# Patient Record
Sex: MALE | Race: OTHER | URBAN - METROPOLITAN AREA
[De-identification: names, ages, dates, MRNs, and addresses within clinical notes are randomized per-mention and may not be internally consistent; named-entity substitution may affect disease eponyms.]

---

## 2021-08-30 ENCOUNTER — APPOINTMENT (OUTPATIENT)
Dept: GENERAL RADIOLOGY | Age: 21
DRG: 316 | End: 2021-08-30
Attending: STUDENT IN AN ORGANIZED HEALTH CARE EDUCATION/TRAINING PROGRAM
Payer: MEDICAID

## 2021-08-30 ENCOUNTER — HOSPITAL ENCOUNTER (INPATIENT)
Age: 21
LOS: 3 days | Discharge: HOME | DRG: 316 | End: 2021-09-02
Attending: STUDENT IN AN ORGANIZED HEALTH CARE EDUCATION/TRAINING PROGRAM | Admitting: ORTHOPAEDIC SURGERY
Payer: MEDICAID

## 2021-08-30 DIAGNOSIS — S62.202B: Primary | ICD-10-CM
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
## 2021-08-30 PROBLEM — T14.8XXA COMMINUTED FRACTURE: Status: ACTIVE | Noted: 2021-08-30

## 2021-08-30 LAB
ABO + RH BLD: NORMAL
ALBUMIN SERPL-MCNC: 4.7 G/DL (ref 3.5–5)
ALBUMIN/GLOB SERPL: 1.6 {RATIO} (ref 1.1–2.2)
ALP SERPL-CCNC: 101 U/L (ref 45–117)
ALT SERPL-CCNC: 21 U/L (ref 12–78)
ANION GAP SERPL CALC-SCNC: 9 MMOL/L (ref 5–15)
APTT PPP: 29 SEC (ref 21.2–34.1)
AST SERPL W P-5'-P-CCNC: 18 U/L (ref 15–37)
BASOPHILS # BLD: 0 K/UL (ref 0–0.1)
BASOPHILS NFR BLD: 0 % (ref 0–1)
BILIRUB SERPL-MCNC: 1.1 MG/DL (ref 0.2–1)
BLOOD GROUP ANTIBODIES SERPL: NEGATIVE
BUN SERPL-MCNC: 24 MG/DL (ref 6–20)
BUN/CREAT SERPL: 24 (ref 12–20)
CA-I BLD-MCNC: 9.5 MG/DL (ref 8.5–10.1)
CHLORIDE SERPL-SCNC: 108 MMOL/L (ref 97–108)
CO2 SERPL-SCNC: 22 MMOL/L (ref 21–32)
CREAT SERPL-MCNC: 1 MG/DL (ref 0.7–1.3)
DIFFERENTIAL METHOD BLD: ABNORMAL
EOSINOPHIL # BLD: 0.1 K/UL (ref 0–0.4)
EOSINOPHIL NFR BLD: 1 % (ref 0–7)
ERYTHROCYTE [DISTWIDTH] IN BLOOD BY AUTOMATED COUNT: 12 % (ref 11.5–14.5)
GLOBULIN SER CALC-MCNC: 3 G/DL (ref 2–4)
GLUCOSE SERPL-MCNC: 99 MG/DL (ref 65–100)
HCT VFR BLD AUTO: 45.7 % (ref 36.6–50.3)
HGB BLD-MCNC: 16.2 G/DL (ref 12.1–17)
IMM GRANULOCYTES # BLD AUTO: 0 K/UL (ref 0–0.04)
IMM GRANULOCYTES NFR BLD AUTO: 0 % (ref 0–0.5)
INR PPP: 1.4 (ref 0.9–1.1)
LYMPHOCYTES # BLD: 0.9 K/UL (ref 0.8–3.5)
LYMPHOCYTES NFR BLD: 8 % (ref 12–49)
MCH RBC QN AUTO: 31.8 PG (ref 26–34)
MCHC RBC AUTO-ENTMCNC: 35.4 G/DL (ref 30–36.5)
MCV RBC AUTO: 89.6 FL (ref 80–99)
MONOCYTES # BLD: 1 K/UL (ref 0–1)
MONOCYTES NFR BLD: 8 % (ref 5–13)
NEUTS SEG # BLD: 9.9 K/UL (ref 1.8–8)
NEUTS SEG NFR BLD: 83 % (ref 32–75)
NRBC # BLD: 0 K/UL (ref 0–0.01)
NRBC BLD-RTO: 0 PER 100 WBC
PLATELET # BLD AUTO: 198 K/UL (ref 150–400)
PMV BLD AUTO: 10 FL (ref 8.9–12.9)
POTASSIUM SERPL-SCNC: 3.4 MMOL/L (ref 3.5–5.1)
PROT SERPL-MCNC: 7.7 G/DL (ref 6.4–8.2)
PROTHROMBIN TIME: 16.6 SEC (ref 11.9–14.7)
RBC # BLD AUTO: 5.1 M/UL (ref 4.1–5.7)
SODIUM SERPL-SCNC: 139 MMOL/L (ref 136–145)
SPECIMEN EXP DATE BLD: NORMAL
THERAPEUTIC RANGE,PTTT: NORMAL SEC (ref 82–109)
WBC # BLD AUTO: 11.9 K/UL (ref 4.1–11.1)

## 2021-08-30 PROCEDURE — 90471 IMMUNIZATION ADMIN: CPT

## 2021-08-30 PROCEDURE — 73130 X-RAY EXAM OF HAND: CPT

## 2021-08-30 PROCEDURE — 74011000258 HC RX REV CODE- 258: Performed by: STUDENT IN AN ORGANIZED HEALTH CARE EDUCATION/TRAINING PROGRAM

## 2021-08-30 PROCEDURE — 86900 BLOOD TYPING SEROLOGIC ABO: CPT

## 2021-08-30 PROCEDURE — 80053 COMPREHEN METABOLIC PANEL: CPT

## 2021-08-30 PROCEDURE — 99284 EMERGENCY DEPT VISIT MOD MDM: CPT

## 2021-08-30 PROCEDURE — 65270000029 HC RM PRIVATE

## 2021-08-30 PROCEDURE — 71045 X-RAY EXAM CHEST 1 VIEW: CPT

## 2021-08-30 PROCEDURE — 36415 COLL VENOUS BLD VENIPUNCTURE: CPT

## 2021-08-30 PROCEDURE — 96375 TX/PRO/DX INJ NEW DRUG ADDON: CPT

## 2021-08-30 PROCEDURE — 93005 ELECTROCARDIOGRAM TRACING: CPT

## 2021-08-30 PROCEDURE — 85730 THROMBOPLASTIN TIME PARTIAL: CPT

## 2021-08-30 PROCEDURE — 85025 COMPLETE CBC W/AUTO DIFF WBC: CPT

## 2021-08-30 PROCEDURE — 75810000283 HC INJECTION NERVE BLOCK

## 2021-08-30 PROCEDURE — 96374 THER/PROPH/DIAG INJ IV PUSH: CPT

## 2021-08-30 PROCEDURE — 90715 TDAP VACCINE 7 YRS/> IM: CPT | Performed by: STUDENT IN AN ORGANIZED HEALTH CARE EDUCATION/TRAINING PROGRAM

## 2021-08-30 PROCEDURE — 73090 X-RAY EXAM OF FOREARM: CPT

## 2021-08-30 PROCEDURE — 85610 PROTHROMBIN TIME: CPT

## 2021-08-30 PROCEDURE — 74011250636 HC RX REV CODE- 250/636: Performed by: STUDENT IN AN ORGANIZED HEALTH CARE EDUCATION/TRAINING PROGRAM

## 2021-08-30 PROCEDURE — 74011250637 HC RX REV CODE- 250/637: Performed by: STUDENT IN AN ORGANIZED HEALTH CARE EDUCATION/TRAINING PROGRAM

## 2021-08-30 RX ORDER — ACETAMINOPHEN 500 MG
1000 TABLET ORAL
Status: COMPLETED | OUTPATIENT
Start: 2021-08-30 | End: 2021-08-30

## 2021-08-30 RX ORDER — FENTANYL CITRATE 50 UG/ML
100 INJECTION, SOLUTION INTRAMUSCULAR; INTRAVENOUS
Status: DISCONTINUED | OUTPATIENT
Start: 2021-08-30 | End: 2021-08-31

## 2021-08-30 RX ORDER — FENTANYL CITRATE 50 UG/ML
50 INJECTION, SOLUTION INTRAMUSCULAR; INTRAVENOUS
Status: COMPLETED | OUTPATIENT
Start: 2021-08-30 | End: 2021-08-31

## 2021-08-30 RX ORDER — LIDOCAINE HYDROCHLORIDE 10 MG/ML
10 INJECTION INFILTRATION; PERINEURAL ONCE
Status: DISCONTINUED | OUTPATIENT
Start: 2021-08-30 | End: 2021-08-30

## 2021-08-30 RX ORDER — OXYCODONE HYDROCHLORIDE 5 MG/1
5 TABLET ORAL
Status: COMPLETED | OUTPATIENT
Start: 2021-08-30 | End: 2021-08-30

## 2021-08-30 RX ORDER — MORPHINE SULFATE 4 MG/ML
4 INJECTION INTRAVENOUS ONCE
Status: COMPLETED | OUTPATIENT
Start: 2021-08-30 | End: 2021-08-30

## 2021-08-30 RX ORDER — LIDOCAINE HYDROCHLORIDE 10 MG/ML
10 INJECTION INFILTRATION; PERINEURAL ONCE
Status: COMPLETED | OUTPATIENT
Start: 2021-08-30 | End: 2021-08-31

## 2021-08-30 RX ADMIN — ACETAMINOPHEN 1000 MG: 500 TABLET ORAL at 19:20

## 2021-08-30 RX ADMIN — TETANUS TOXOID, REDUCED DIPHTHERIA TOXOID AND ACELLULAR PERTUSSIS VACCINE, ADSORBED 0.5 ML: 5; 2.5; 8; 8; 2.5 SUSPENSION INTRAMUSCULAR at 19:20

## 2021-08-30 RX ADMIN — AMPICILLIN SODIUM AND SULBACTAM SODIUM 3 G: 2; 1 INJECTION, POWDER, FOR SOLUTION INTRAMUSCULAR; INTRAVENOUS at 21:05

## 2021-08-30 RX ADMIN — MORPHINE SULFATE 4 MG: 4 INJECTION, SOLUTION INTRAMUSCULAR; INTRAVENOUS at 21:05

## 2021-08-30 RX ADMIN — OXYCODONE 5 MG: 5 TABLET ORAL at 19:20

## 2021-08-30 NOTE — Clinical Note
Status[de-identified] INPATIENT [101]   Type of Bed: Surgical [18]   Inpatient Hospitalization Certified Necessary for the Following Reasons: 9.  Other (further clarification in H&P documentation)   Admitting Diagnosis: Dog bite [[id number]]   Admitting Diagnosis: Comminuted fracture [[id number]]   Admitting Physician: [id number], [address] [facility]   Attending Physician: Jaci Marie   Estimated Length of Stay: 3-4 Midnights   Discharge Plan[de-identified] Home with Office Follow-up

## 2021-08-31 ENCOUNTER — ANESTHESIA (OUTPATIENT)
Dept: SURGERY | Age: 21
DRG: 316 | End: 2021-08-31
Payer: MEDICAID

## 2021-08-31 ENCOUNTER — ANESTHESIA EVENT (OUTPATIENT)
Dept: SURGERY | Age: 21
DRG: 316 | End: 2021-08-31
Payer: MEDICAID

## 2021-08-31 ENCOUNTER — APPOINTMENT (OUTPATIENT)
Dept: GENERAL RADIOLOGY | Age: 21
DRG: 316 | End: 2021-08-31
Attending: ORTHOPAEDIC SURGERY
Payer: MEDICAID

## 2021-08-31 LAB
ATRIAL RATE: 68 BPM
CALCULATED P AXIS, ECG09: 55 DEGREES
CALCULATED R AXIS, ECG10: 71 DEGREES
CALCULATED T AXIS, ECG11: 45 DEGREES
COVID-19 RAPID TEST, COVR: NOT DETECTED
DIAGNOSIS, 93000: NORMAL
P-R INTERVAL, ECG05: 190 MS
Q-T INTERVAL, ECG07: 390 MS
QRS DURATION, ECG06: 106 MS
QTC CALCULATION (BEZET), ECG08: 414 MS
SPECIMEN SOURCE: NORMAL
VENTRICULAR RATE, ECG03: 68 BPM

## 2021-08-31 PROCEDURE — 77030012058: Performed by: ORTHOPAEDIC SURGERY

## 2021-08-31 PROCEDURE — 36415 COLL VENOUS BLD VENIPUNCTURE: CPT

## 2021-08-31 PROCEDURE — 74011000250 HC RX REV CODE- 250: Performed by: NURSE ANESTHETIST, CERTIFIED REGISTERED

## 2021-08-31 PROCEDURE — 77030003028 HC SUT VCRL J&J -A: Performed by: ORTHOPAEDIC SURGERY

## 2021-08-31 PROCEDURE — 74011250636 HC RX REV CODE- 250/636: Performed by: ORTHOPAEDIC SURGERY

## 2021-08-31 PROCEDURE — 77030002982 HC SUT POLYSRB J&J -A: Performed by: ORTHOPAEDIC SURGERY

## 2021-08-31 PROCEDURE — 76210000017 HC OR PH I REC 1.5 TO 2 HR: Performed by: ORTHOPAEDIC SURGERY

## 2021-08-31 PROCEDURE — 74011250636 HC RX REV CODE- 250/636: Performed by: ANESTHESIOLOGY

## 2021-08-31 PROCEDURE — 74011000258 HC RX REV CODE- 258: Performed by: STUDENT IN AN ORGANIZED HEALTH CARE EDUCATION/TRAINING PROGRAM

## 2021-08-31 PROCEDURE — 77030031140 HC SUT VCRL3 J&J -A: Performed by: ORTHOPAEDIC SURGERY

## 2021-08-31 PROCEDURE — 74011000250 HC RX REV CODE- 250: Performed by: STUDENT IN AN ORGANIZED HEALTH CARE EDUCATION/TRAINING PROGRAM

## 2021-08-31 PROCEDURE — 2709999900 HC NON-CHARGEABLE SUPPLY: Performed by: ORTHOPAEDIC SURGERY

## 2021-08-31 PROCEDURE — 74011250636 HC RX REV CODE- 250/636: Performed by: NURSE ANESTHETIST, CERTIFIED REGISTERED

## 2021-08-31 PROCEDURE — 87635 SARS-COV-2 COVID-19 AMP PRB: CPT

## 2021-08-31 PROCEDURE — 74011250636 HC RX REV CODE- 250/636: Performed by: STUDENT IN AN ORGANIZED HEALTH CARE EDUCATION/TRAINING PROGRAM

## 2021-08-31 PROCEDURE — 76060000034 HC ANESTHESIA 1.5 TO 2 HR: Performed by: ORTHOPAEDIC SURGERY

## 2021-08-31 PROCEDURE — 0KB90ZZ EXCISION OF RIGHT LOWER ARM AND WRIST MUSCLE, OPEN APPROACH: ICD-10-PCS | Performed by: ORTHOPAEDIC SURGERY

## 2021-08-31 PROCEDURE — 76000 FLUOROSCOPY <1 HR PHYS/QHP: CPT

## 2021-08-31 PROCEDURE — 77030008463 HC STPLR SKN PROX J&J -B: Performed by: ORTHOPAEDIC SURGERY

## 2021-08-31 PROCEDURE — 76010000153 HC OR TIME 1.5 TO 2 HR: Performed by: ORTHOPAEDIC SURGERY

## 2021-08-31 PROCEDURE — 77030002916 HC SUT ETHLN J&J -A: Performed by: ORTHOPAEDIC SURGERY

## 2021-08-31 PROCEDURE — 77030040361 HC SLV COMPR DVT MDII -B: Performed by: ORTHOPAEDIC SURGERY

## 2021-08-31 PROCEDURE — 65270000029 HC RM PRIVATE

## 2021-08-31 PROCEDURE — 0PBQ0ZZ EXCISION OF LEFT METACARPAL, OPEN APPROACH: ICD-10-PCS | Performed by: ORTHOPAEDIC SURGERY

## 2021-08-31 RX ORDER — KETOROLAC TROMETHAMINE 30 MG/ML
15 INJECTION, SOLUTION INTRAMUSCULAR; INTRAVENOUS
Status: DISCONTINUED | OUTPATIENT
Start: 2021-08-31 | End: 2021-08-31 | Stop reason: HOSPADM

## 2021-08-31 RX ORDER — FENTANYL CITRATE 50 UG/ML
INJECTION, SOLUTION INTRAMUSCULAR; INTRAVENOUS AS NEEDED
Status: DISCONTINUED | OUTPATIENT
Start: 2021-08-31 | End: 2021-08-31 | Stop reason: HOSPADM

## 2021-08-31 RX ORDER — MORPHINE SULFATE 4 MG/ML
4 INJECTION INTRAVENOUS
Status: DISCONTINUED | OUTPATIENT
Start: 2021-08-31 | End: 2021-08-31

## 2021-08-31 RX ORDER — FENTANYL CITRATE 50 UG/ML
50 INJECTION, SOLUTION INTRAMUSCULAR; INTRAVENOUS
Status: DISCONTINUED | OUTPATIENT
Start: 2021-08-31 | End: 2021-08-31 | Stop reason: HOSPADM

## 2021-08-31 RX ORDER — NORETHINDRONE AND ETHINYL ESTRADIOL 0.5-0.035
5 KIT ORAL AS NEEDED
Status: DISCONTINUED | OUTPATIENT
Start: 2021-08-31 | End: 2021-08-31 | Stop reason: HOSPADM

## 2021-08-31 RX ORDER — SODIUM CHLORIDE 0.9 % (FLUSH) 0.9 %
5-40 SYRINGE (ML) INJECTION AS NEEDED
Status: DISCONTINUED | OUTPATIENT
Start: 2021-08-31 | End: 2021-08-31 | Stop reason: HOSPADM

## 2021-08-31 RX ORDER — HYDROMORPHONE HYDROCHLORIDE 1 MG/ML
0.4 INJECTION, SOLUTION INTRAMUSCULAR; INTRAVENOUS; SUBCUTANEOUS
Status: DISCONTINUED | OUTPATIENT
Start: 2021-08-31 | End: 2021-08-31 | Stop reason: HOSPADM

## 2021-08-31 RX ORDER — SODIUM CHLORIDE 0.9 % (FLUSH) 0.9 %
5-40 SYRINGE (ML) INJECTION AS NEEDED
Status: DISCONTINUED | OUTPATIENT
Start: 2021-08-31 | End: 2021-09-02 | Stop reason: HOSPADM

## 2021-08-31 RX ORDER — ONDANSETRON 2 MG/ML
4 INJECTION INTRAMUSCULAR; INTRAVENOUS AS NEEDED
Status: DISCONTINUED | OUTPATIENT
Start: 2021-08-31 | End: 2021-08-31 | Stop reason: HOSPADM

## 2021-08-31 RX ORDER — MORPHINE SULFATE 4 MG/ML
4 INJECTION INTRAVENOUS
Status: DISPENSED | OUTPATIENT
Start: 2021-08-31 | End: 2021-09-02

## 2021-08-31 RX ORDER — PROPOFOL 10 MG/ML
INJECTION, EMULSION INTRAVENOUS AS NEEDED
Status: DISCONTINUED | OUTPATIENT
Start: 2021-08-31 | End: 2021-08-31 | Stop reason: HOSPADM

## 2021-08-31 RX ORDER — ONDANSETRON 2 MG/ML
INJECTION INTRAMUSCULAR; INTRAVENOUS AS NEEDED
Status: DISCONTINUED | OUTPATIENT
Start: 2021-08-31 | End: 2021-08-31 | Stop reason: HOSPADM

## 2021-08-31 RX ORDER — LIDOCAINE HYDROCHLORIDE 20 MG/ML
INJECTION, SOLUTION EPIDURAL; INFILTRATION; INTRACAUDAL; PERINEURAL AS NEEDED
Status: DISCONTINUED | OUTPATIENT
Start: 2021-08-31 | End: 2021-08-31 | Stop reason: HOSPADM

## 2021-08-31 RX ORDER — SODIUM CHLORIDE 0.9 % (FLUSH) 0.9 %
5-40 SYRINGE (ML) INJECTION EVERY 8 HOURS
Status: DISCONTINUED | OUTPATIENT
Start: 2021-08-31 | End: 2021-09-02 | Stop reason: HOSPADM

## 2021-08-31 RX ORDER — LIDOCAINE HYDROCHLORIDE 10 MG/ML
0.1 INJECTION, SOLUTION EPIDURAL; INFILTRATION; INTRACAUDAL; PERINEURAL AS NEEDED
Status: DISCONTINUED | OUTPATIENT
Start: 2021-08-31 | End: 2021-08-31 | Stop reason: HOSPADM

## 2021-08-31 RX ORDER — SODIUM CHLORIDE, SODIUM LACTATE, POTASSIUM CHLORIDE, CALCIUM CHLORIDE 600; 310; 30; 20 MG/100ML; MG/100ML; MG/100ML; MG/100ML
INJECTION, SOLUTION INTRAVENOUS
Status: DISCONTINUED | OUTPATIENT
Start: 2021-08-31 | End: 2021-08-31 | Stop reason: HOSPADM

## 2021-08-31 RX ORDER — SODIUM CHLORIDE 0.9 % (FLUSH) 0.9 %
5-40 SYRINGE (ML) INJECTION EVERY 8 HOURS
Status: DISCONTINUED | OUTPATIENT
Start: 2021-08-31 | End: 2021-08-31 | Stop reason: HOSPADM

## 2021-08-31 RX ORDER — MIDAZOLAM HYDROCHLORIDE 1 MG/ML
INJECTION, SOLUTION INTRAMUSCULAR; INTRAVENOUS AS NEEDED
Status: DISCONTINUED | OUTPATIENT
Start: 2021-08-31 | End: 2021-08-31 | Stop reason: HOSPADM

## 2021-08-31 RX ORDER — DEXAMETHASONE SODIUM PHOSPHATE 4 MG/ML
INJECTION, SOLUTION INTRA-ARTICULAR; INTRALESIONAL; INTRAMUSCULAR; INTRAVENOUS; SOFT TISSUE AS NEEDED
Status: DISCONTINUED | OUTPATIENT
Start: 2021-08-31 | End: 2021-08-31 | Stop reason: HOSPADM

## 2021-08-31 RX ORDER — HYDROCODONE BITARTRATE AND ACETAMINOPHEN 5; 325 MG/1; MG/1
1 TABLET ORAL AS NEEDED
Status: DISCONTINUED | OUTPATIENT
Start: 2021-08-31 | End: 2021-08-31 | Stop reason: HOSPADM

## 2021-08-31 RX ORDER — MIDAZOLAM HYDROCHLORIDE 1 MG/ML
0.5 INJECTION, SOLUTION INTRAMUSCULAR; INTRAVENOUS
Status: DISCONTINUED | OUTPATIENT
Start: 2021-08-31 | End: 2021-08-31 | Stop reason: HOSPADM

## 2021-08-31 RX ADMIN — FENTANYL CITRATE 25 MCG: 50 INJECTION, SOLUTION INTRAMUSCULAR; INTRAVENOUS at 15:31

## 2021-08-31 RX ADMIN — ONDANSETRON 4 MG: 2 INJECTION INTRAMUSCULAR; INTRAVENOUS at 15:04

## 2021-08-31 RX ADMIN — FENTANYL CITRATE 25 MCG: 50 INJECTION, SOLUTION INTRAMUSCULAR; INTRAVENOUS at 15:27

## 2021-08-31 RX ADMIN — SODIUM CHLORIDE, POTASSIUM CHLORIDE, SODIUM LACTATE AND CALCIUM CHLORIDE: 600; 310; 30; 20 INJECTION, SOLUTION INTRAVENOUS at 14:45

## 2021-08-31 RX ADMIN — FENTANYL CITRATE 50 MCG: 50 INJECTION, SOLUTION INTRAMUSCULAR; INTRAVENOUS at 17:28

## 2021-08-31 RX ADMIN — MORPHINE SULFATE 4 MG: 4 INJECTION INTRAVENOUS at 01:56

## 2021-08-31 RX ADMIN — AMPICILLIN SODIUM AND SULBACTAM SODIUM 3 G: 2; 1 INJECTION, POWDER, FOR SOLUTION INTRAMUSCULAR; INTRAVENOUS at 22:26

## 2021-08-31 RX ADMIN — FENTANYL CITRATE 25 MCG: 50 INJECTION, SOLUTION INTRAMUSCULAR; INTRAVENOUS at 15:07

## 2021-08-31 RX ADMIN — MORPHINE SULFATE 4 MG: 4 INJECTION INTRAVENOUS at 07:29

## 2021-08-31 RX ADMIN — MORPHINE SULFATE 4 MG: 4 INJECTION, SOLUTION INTRAMUSCULAR; INTRAVENOUS at 19:34

## 2021-08-31 RX ADMIN — FENTANYL CITRATE 50 MCG: 50 INJECTION, SOLUTION INTRAMUSCULAR; INTRAVENOUS at 17:10

## 2021-08-31 RX ADMIN — PROPOFOL 200 MG: 10 INJECTION, EMULSION INTRAVENOUS at 14:47

## 2021-08-31 RX ADMIN — MIDAZOLAM HYDROCHLORIDE 2 MG: 2 INJECTION, SOLUTION INTRAMUSCULAR; INTRAVENOUS at 14:44

## 2021-08-31 RX ADMIN — FENTANYL CITRATE 25 MCG: 50 INJECTION, SOLUTION INTRAMUSCULAR; INTRAVENOUS at 15:50

## 2021-08-31 RX ADMIN — LIDOCAINE HYDROCHLORIDE 100 MG: 20 INJECTION, SOLUTION EPIDURAL; INFILTRATION; INTRACAUDAL; PERINEURAL at 14:47

## 2021-08-31 RX ADMIN — AMPICILLIN SODIUM AND SULBACTAM SODIUM 3 G: 2; 1 INJECTION, POWDER, FOR SOLUTION INTRAMUSCULAR; INTRAVENOUS at 10:52

## 2021-08-31 RX ADMIN — LIDOCAINE HYDROCHLORIDE 10 ML: 10 INJECTION, SOLUTION INFILTRATION; PERINEURAL at 00:04

## 2021-08-31 RX ADMIN — DEXAMETHASONE SODIUM PHOSPHATE 8 MG: 4 INJECTION, SOLUTION INTRA-ARTICULAR; INTRALESIONAL; INTRAMUSCULAR; INTRAVENOUS; SOFT TISSUE at 15:04

## 2021-08-31 RX ADMIN — MORPHINE SULFATE 4 MG: 4 INJECTION, SOLUTION INTRAMUSCULAR; INTRAVENOUS at 11:46

## 2021-08-31 RX ADMIN — AMPICILLIN SODIUM AND SULBACTAM SODIUM 3 G: 2; 1 INJECTION, POWDER, FOR SOLUTION INTRAMUSCULAR; INTRAVENOUS at 17:55

## 2021-08-31 RX ADMIN — Medication 10 ML: at 22:31

## 2021-08-31 RX ADMIN — MORPHINE SULFATE 4 MG: 4 INJECTION, SOLUTION INTRAMUSCULAR; INTRAVENOUS at 23:17

## 2021-08-31 RX ADMIN — SODIUM CHLORIDE, POTASSIUM CHLORIDE, SODIUM LACTATE AND CALCIUM CHLORIDE: 600; 310; 30; 20 INJECTION, SOLUTION INTRAVENOUS at 15:04

## 2021-08-31 RX ADMIN — AMPICILLIN SODIUM AND SULBACTAM SODIUM 3 G: 2; 1 INJECTION, POWDER, FOR SOLUTION INTRAMUSCULAR; INTRAVENOUS at 03:31

## 2021-08-31 RX ADMIN — FENTANYL CITRATE 50 MCG: 50 INJECTION, SOLUTION INTRAMUSCULAR; INTRAVENOUS at 00:49

## 2021-08-31 RX ADMIN — Medication 10 ML: at 18:11

## 2021-08-31 NOTE — ED PROVIDER NOTES
EMERGENCY DEPARTMENT HISTORY AND PHYSICAL EXAM      Date: 8/30/2021  Patient Name: Michel Wright      History of Presenting Illness     Chief Complaint   Patient presents with    Animal Bite       History Provided By: Patient    HPI: Michel Wright, 24 y.o. male with no chronic PMH of note presented emerge department after being bit at home by his dog. 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 He reports to injuries. One on his right forearm and one on his left hand. Patient denied any other injuries. He does not recall his last tetanus shot. Patient denied any prior incidences with his dog. Patient is denying any weakness or numbness of his hand. He does report that he has significant pain when he is trying to move his hand. Otherwise, he is able to move his fingers. There are no other complaints, changes, or physical findings at this time. PCP: None    Current Facility-Administered Medications   Medication Dose Route Frequency Provider Last Rate Last Admin    ampicillin-sulbactam (UNASYN) 3 g in 0.9% sodium chloride (MBP/ADV) 100 mL MBP  3 g IntraVENous Q6H Esvin Kumari MD        fentaNYL citrate (PF) injection 50 mcg  50 mcg IntraVENous NOW Esvin Kumari MD        fentaNYL citrate (PF) injection 100 mcg  100 mcg IntraVENous NOW Esvin Kumari MD           Past History     Past Medical History:  None    Past Surgical History:  None    Family History:  Noncontributory    Social History:  Social History     Tobacco Use    Smoking status: Not on file   Substance Use Topics    Alcohol use: Not on file    Drug use: Not on file       Allergies: Allergies   Allergen Reactions    Benadryl [Diphenhydramine Hcl] Angioedema         Review of Systems     Review of Systems   Constitutional: Negative for activity change, chills and fever. HENT: Negative for congestion and facial swelling. Eyes: Negative for discharge and visual disturbance. Respiratory: Negative for chest tightness and shortness of breath. Cardiovascular: Negative for chest pain and leg swelling. Gastrointestinal: Negative for abdominal pain, diarrhea and vomiting. Genitourinary: Negative for dysuria and flank pain. Musculoskeletal: Negative for back pain and gait problem. Skin: Positive for wound. Negative for rash. Neurological: Negative for dizziness, weakness and headaches. Physical Exam     Physical Exam  Vitals and nursing note reviewed. Constitutional:       General: He is not in acute distress. Appearance: Normal appearance. He is not ill-appearing, toxic-appearing or diaphoretic. HENT:      Head: Normocephalic and atraumatic. Mouth/Throat:      Mouth: Mucous membranes are moist.      Pharynx: Oropharynx is clear. Eyes:      Extraocular Movements: Extraocular movements intact. Conjunctiva/sclera: Conjunctivae normal.   Cardiovascular:      Rate and Rhythm: Normal rate and regular rhythm. Pulmonary:      Effort: Pulmonary effort is normal.      Breath sounds: Normal breath sounds. Abdominal:      General: Abdomen is flat. Tenderness: There is no abdominal tenderness. Musculoskeletal:      Right upper arm: Normal.      Left upper arm: Normal.      Right elbow: No swelling, deformity, effusion or lacerations. Normal range of motion. No tenderness. Left elbow: No swelling, deformity, effusion or lacerations. Normal range of motion. No tenderness. Right forearm: Laceration (Abrasion) and tenderness present. No swelling, edema, deformity or bony tenderness. Left forearm: No swelling, edema, deformity, lacerations, tenderness or bony tenderness. Right wrist: Normal.      Left wrist: Normal.      Right hand: Laceration, tenderness and bony tenderness present. Decreased range of motion. Normal strength. Normal sensation. Normal capillary refill. Normal pulse.       Left hand: Normal.      Cervical back: Normal range of motion and neck supple. Neurological:      Mental Status: He is alert and oriented to person, place, and time. Sensory: No sensory deficit. Motor: No weakness. Lab and Diagnostic Study Results     Labs -     Recent Results (from the past 12 hour(s))   CBC WITH AUTOMATED DIFF    Collection Time: 08/30/21  8:44 PM   Result Value Ref Range    WBC 11.9 (H) 4.1 - 11.1 K/uL    RBC 5.10 4. 10 - 5.70 M/uL    HGB 16.2 12.1 - 17.0 g/dL    HCT 45.7 36.6 - 50.3 %    MCV 89.6 80.0 - 99.0 FL    MCH 31.8 26.0 - 34.0 PG    MCHC 35.4 30.0 - 36.5 g/dL    RDW 12.0 11.5 - 14.5 %    PLATELET 820 471 - 256 K/uL    MPV 10.0 8.9 - 12.9 FL    NRBC 0.0 0.0  WBC    ABSOLUTE NRBC 0.00 0.00 - 0.01 K/uL    NEUTROPHILS 83 (H) 32 - 75 %    LYMPHOCYTES 8 (L) 12 - 49 %    MONOCYTES 8 5 - 13 %    EOSINOPHILS 1 0 - 7 %    BASOPHILS 0 0 - 1 %    IMMATURE GRANULOCYTES 0 0 - 0.5 %    ABS. NEUTROPHILS 9.9 (H) 1.8 - 8.0 K/UL    ABS. LYMPHOCYTES 0.9 0.8 - 3.5 K/UL    ABS. MONOCYTES 1.0 0.0 - 1.0 K/UL    ABS. EOSINOPHILS 0.1 0.0 - 0.4 K/UL    ABS. BASOPHILS 0.0 0.0 - 0.1 K/UL    ABS. IMM. GRANS. 0.0 0.00 - 0.04 K/UL    DF AUTOMATED     METABOLIC PANEL, COMPREHENSIVE    Collection Time: 08/30/21  8:44 PM   Result Value Ref Range    Sodium 139 136 - 145 mmol/L    Potassium 3.4 (L) 3.5 - 5.1 mmol/L    Chloride 108 97 - 108 mmol/L    CO2 22 21 - 32 mmol/L    Anion gap 9 5 - 15 mmol/L    Glucose 99 65 - 100 mg/dL    BUN 24 (H) 6 - 20 mg/dL    Creatinine 1.00 0.70 - 1.30 mg/dL    BUN/Creatinine ratio 24 (H) 12 - 20      GFR est AA >60 >60 ml/min/1.73m2    GFR est non-AA >60 >60 ml/min/1.73m2    Calcium 9.5 8.5 - 10.1 mg/dL    Bilirubin, total 1.1 (H) 0.2 - 1.0 mg/dL    AST (SGOT) 18 15 - 37 U/L    ALT (SGPT) 21 12 - 78 U/L    Alk.  phosphatase 101 45 - 117 U/L    Protein, total 7.7 6.4 - 8.2 g/dL    Albumin 4.7 3.5 - 5.0 g/dL    Globulin 3.0 2.0 - 4.0 g/dL    A-G Ratio 1.6 1.1 - 2.2     PROTHROMBIN TIME + INR    Collection Time: 08/30/21  8:44 PM   Result Value Ref Range    Prothrombin time 16.6 (H) 11.9 - 14.7 sec    INR 1.4 (H) 0.9 - 1.1     PTT    Collection Time: 08/30/21  8:44 PM   Result Value Ref Range    aPTT 29.0 21.2 - 34.1 sec    aPTT, therapeutic range   82 - 109 sec   TYPE & SCREEN    Collection Time: 08/30/21  8:44 PM   Result Value Ref Range    Crossmatch Expiration 09/02/2021,2359     ABO/Rh(D) Shante Richard Positive     Antibody screen Negative        Radiologic Studies -   [unfilled]  CT Results  (Last 48 hours)    None        CXR Results  (Last 48 hours)               08/30/21 2129  XR CHEST PORT Final result    Impression:  No evidence of an acute cardiopulmonary process. Narrative:  XR CHEST PORT       Comparison:  None available       Single view: The lungs are clear. No pneumothorax or pleural effusion apparent. The cardiomediastinum is unremarkable. There is no evidence of acute cardiac   decompensation. Medical Decision Making and ED Course   - I am the first and primary provider for this patient AND AM THE PRIMARY PROVIDER OF RECORD. - I reviewed the vital signs, available nursing notes, past medical history, past surgical history, family history and social history. - Initial assessment performed. The patients presenting problems have been discussed, and the staff are in agreement with the care plan formulated and outlined with them. I have encouraged them to ask questions as they arise throughout their visit. Vital Signs-Reviewed the patient's vital signs. Patient Vitals for the past 12 hrs:   Pulse Resp BP SpO2   08/31/21 0043 62 18 122/83 97 %         Records Reviewed: Nursing Notes    Provider Notes (Medical Decision Making):   26-year-old male who presents for department for evaluation of dog bite.   Patient does have a deeper laceration on the left hand and superficial single puncture wound on right forearm there appears to be superficial.  Concerned that the patient has an underlying bony injury. Will get images and consult orthopedics. ED Course:   Right forearm x-ray did not show foreign body. However, there is subcutaneous gas in the fat plane caused by the puncture. The hand showed comminuted fracture of the first intercarpal with overlying subcutaneous emphysema. There is no foreign body identified in the x-ray. Will consult orthopedic. 1100 PM  I did discuss the patient with the orthopedic specialist Dr. Gurdeep Miranda. He recommends intravenous antibiotics and pain control. Extensive bedside irrigation washout and compressive dressing with Betadine. Patient will be admitted to his service and will be taken for operative management in the morning. For antibiotic choice, Unasyn every 6 hours and pain control with intravenous narcotics. Preop labs, EKG and chest x-ray were ordered. 1230 AM  Patient was seen and evaluated by Dr. Gurdeep Miranda at the time of procedure. Procedures and Critical Care       Performed by: Eulalio Grayson MD  PROCEDURES:  Wound irrigation    Date/Time: 8/31/2021 12:34 AM  Performed by: Kenna Molina MD  Authorized by: Kenna Molina MD     Consent:     Consent obtained:  Verbal    Consent given by:  Patient    Risks discussed:  Bleeding, infection, pain, incomplete drainage and nerve damage    Alternatives discussed:  No treatment  Indications:     Indications:  Contaminated wound secondary to dog bite  Anesthesia (see MAR for exact dosages): Anesthesia method:  Nerve block    Block needle gauge:  25 G    Block anesthetic:  Lidocaine 1% w/o epi    Block technique:  Nerve block was performed by Dr. Carmen Mario outcome:  Anesthesia achieved  Post-procedure details:     Patient tolerance of procedure: Tolerated well, no immediate complications  Comments:      Contaminated wounds on both left and and right forearm were thoroughly irrigated. Right forearm was irrigated with approximately 750 cc.   Left hand deep tissue wound was irrigated with approximately 2.25 L. Irrigation was with just irrigation to the wound. Both wounds were reexamined and there is no visualized foreign body. Left hand wound was packed and covered with gauze covered with a Betadine and a compressive dressing. Right forearm was covered with Betadine soaked gauze and compressive dressing. Disposition     Disposition: Condition stable  Admitted to Floor Surgical Floor the case was discussed with the admitting physician Dr. Alondra Hummel    Admitted      Diagnosis     Clinical Impression:   1. Open fracture of first metacarpal bone of left hand, unspecified fracture morphology, unspecified portion of metacarpal, initial encounter    2. Dog bite, initial encounter        Attestations: Jim Starkey MD    Please note that this dictation was completed with [employer], the computer voice recognition software. Quite often unanticipated grammatical, syntax, homophones, and other interpretive errors are inadvertently transcribed by the computer software. Please disregard these errors. Please excuse any errors that have escaped final proofreading. Thank you.

## 2021-08-31 NOTE — OP NOTES
Operative Report    Patient: Dianna Castillo MRN: [id number]  SSN: [id number]    YOB: 2000  Age: 24 y.o. Sex: male       Date of Surgery: 8/31/2021     Preoperative Diagnosis: Dog bite left hand and right forearm    Postoperative Diagnosis: Same as preoperative diagnosis    Surgeon(s) and Role:     Faviola Diaz MD - Primary    Anesthesia: General     Assistants: Surg Asst-1: Myrtle Rivera    Procedure:   1) excisional debridement of dog bite open wounds of the left hand to bone, overall length of 7 cm and depth of 2 to 3 cm  2) excisional debridement of dog bite open wound of right forearm to muscle, overall length 6 cm in depth 3 cm  3) irrigation and debridement of open fracture of left first metacarpal  4) open treatment of left first metacarpal fracture without internal fixation    Procedure in Detail: The patient and operative site were identified in the preoperative holding area. All questions were answered. After induction of anesthesia, the patient was positioned supine. Both upper extremities were placed in side tables. Standard prepping and draping of both upper extremities was performed into surgical fields. Timeout was called. The right forearm was addressed first.  A 1 cm laceration on the anterior aspect of the midforearm was noted. Probing the wound immediately showed this wound to be deep, and extending into the deep tissues. The laceration was extended proximally and distally. Significant tearing of the muscles in the flexor aspect of the forearm was noted, with tunneling down to the depths of the forearm. No active bleeding was noted. The wound was thoroughly irrigated, with minimal excisional debridement needed with Metzenbaum scissors. The wound was packed with iodoform gauze, and left open. We turned our attention to the left hand. Examination showed a 4 cm laceration of the thenar eminence. The tourniquet was inflated.   The wound was noted to extend to a depth of approximately 3 cm. The flexor tendon and sheath were noted to be intact. Division and reventing of the thenar muscles was noted. Minimal excisional debridement was required with the Metzenbaum scissors. Thorough irrigation was performed with copious normal saline. I turned my attention to the wound on the dorsal of the MCP joint and the first metacarpal.  The wound was approximately 2 cm, and was extended. The wound was noted to track down to the first metacarpal fracture, with a split butterfly fragment with the force of the canine bite. The fracture was noted to be in acceptable alignment and position, and did not appear unstable. Meticulous debridement was performed with a rongeur, and thorough irrigation was performed of the fracture site as well as open wound. I determined that the fracture should be treated without internal fixation, due to the potential of contamination as well as fracture stability noted. The tourniquet was released and hemostasis was confirmed. The wounds were packed with iodoform gauze. All surgical wounds were now dressed with a soft absorbent bulky compressive dressings. The patient tolerated procedure well and was transferred to the recovery room in stable condition. Postoperatively, the patient will be started on a program of occupational therapy for mobilization, intravenous followed by oral antibiotics will be continued per standard protocol. Daily wound packing dressing changes will be initiated. Estimated Blood Loss: Minimal    Tourniquet Time:   Total Tourniquet Time Documented:  Upper Arm (Left) - 17 minutes  Total: Upper Arm (Left) - 17 minutes      Implants: * No implants in log *            Specimens: * No specimens in log *        Drains: None                Complications: None    Counts: Sponge and needle counts were correct times two.     Signed By:  Catrachito Prakash MD     August 31, 2021

## 2021-08-31 NOTE — H&P
Consult Date: 8/31/2021    Consults  ASSESSMENT:    1)   Dog bite of left hand with extensive laceration. 2)  Punctate dog bite of right forearm of indeterminate depth  3)  Nondisplaced open fracture of left 1st metacarpal    PLAN:   I had a detailed discussion with the patient as well as with the ED physician about the nature of the injury, its natural history and treatment options. I recommended and offered surgical debridement in the operating room under anesthesia. Meanwhile, I have advised and recommended admission to the hospital, and immediately starting broad-spectrum  appropriate intravenous antibiotics. the ED physician will perform a thorough preliminary washout a dressing of the wounds with local anesthetic, while the patient is awaiting going to the operating room. The patient appeared to understand the issues discussed, and wished to proceed with recommended treatment plan. Subjective    Clint Ford, 24 y.o. male with no chronic PMH of note presented emerge department after being bit at home by his dog. 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 He reports to injuries. One on his right forearm and one on his left hand. Patient denied any other injuries. He does not recall his last tetanus shot. Patient denied any prior incidences with his dog. Patient is denying any weakness or numbness of his hand. He does report that he has significant pain when he is trying to move his hand. Otherwise, he is able to move his fingers.     There are no other complaints, changes, or physical findings at this time. The patient describes himself as right hand dominant. He states that he recently moved from [place] to this area of few weeks ago. No past medical history on file. No past surgical history on file. No family history on file.    Social History     Tobacco Use    Smoking status: Not on file   Substance Use Topics    Alcohol use: Not on file       Current Facility-Administered Medications   Medication Dose Route Frequency Provider Last Rate Last Admin    morphine injection 4 mg  4 mg IntraVENous Q4H PRN Esvin Kumari MD   4 mg at 08/31/21 0729    ampicillin-sulbactam (UNASYN) 3 g in 0.9% sodium chloride (MBP/ADV) 100 mL MBP  3 g IntraVENous Q6H Esvin Kumari  mL/hr at 08/31/21 0331 3 g at 08/31/21 0331        Review of Systems  Constitutional: Negative for activity change, chills and fever. HENT: Negative for congestion and facial swelling. Eyes: Negative for discharge and visual disturbance. Respiratory: Negative for chest tightness and shortness of breath. Cardiovascular: Negative for chest pain and leg swelling. Gastrointestinal: Negative for abdominal pain, diarrhea and vomiting. Genitourinary: Negative for dysuria and flank pain. Musculoskeletal: Negative for back pain and gait problem. Skin: Positive for wound. Negative for rash. Neurological: Negative for dizziness, weakness and headaches. Objective     Vital signs for last 24 hours:  Visit Vitals  BP (!) 127/95   Pulse 68   Temp 98.5 °F (36.9 °C)   Resp 20   Ht 5' 6.5\" (1.689 m)   Wt 70.9 kg (156 lb 6 oz)   SpO2 100%   BMI 24.86 kg/m²       Intake/Output this shift:  Current Shift: No intake/output data recorded. Last 3 Shifts: No intake/output data recorded. Data Review:   Recent Results (from the past 24 hour(s))   CBC WITH AUTOMATED DIFF    Collection Time: 08/30/21  8:44 PM   Result Value Ref Range    WBC 11.9 (H) 4.1 - 11.1 K/uL    RBC 5.10 4. 10 - 5.70 M/uL    HGB 16.2 12.1 - 17.0 g/dL    HCT 45.7 36.6 - 50.3 %    MCV 89.6 80.0 - 99.0 FL    MCH 31.8 26.0 - 34.0 PG    MCHC 35.4 30.0 - 36.5 g/dL    RDW 12.0 11.5 - 14.5 %    PLATELET 615 457 - 204 K/uL    MPV 10.0 8.9 - 12.9 FL    NRBC 0.0 0.0  WBC    ABSOLUTE NRBC 0.00 0.00 - 0.01 K/uL    NEUTROPHILS 83 (H) 32 - 75 %    LYMPHOCYTES 8 (L) 12 - 49 %    MONOCYTES 8 5 - 13 % EOSINOPHILS 1 0 - 7 %    BASOPHILS 0 0 - 1 %    IMMATURE GRANULOCYTES 0 0 - 0.5 %    ABS. NEUTROPHILS 9.9 (H) 1.8 - 8.0 K/UL    ABS. LYMPHOCYTES 0.9 0.8 - 3.5 K/UL    ABS. MONOCYTES 1.0 0.0 - 1.0 K/UL    ABS. EOSINOPHILS 0.1 0.0 - 0.4 K/UL    ABS. BASOPHILS 0.0 0.0 - 0.1 K/UL    ABS. IMM. GRANS. 0.0 0.00 - 0.04 K/UL    DF AUTOMATED     METABOLIC PANEL, COMPREHENSIVE    Collection Time: 08/30/21  8:44 PM   Result Value Ref Range    Sodium 139 136 - 145 mmol/L    Potassium 3.4 (L) 3.5 - 5.1 mmol/L    Chloride 108 97 - 108 mmol/L    CO2 22 21 - 32 mmol/L    Anion gap 9 5 - 15 mmol/L    Glucose 99 65 - 100 mg/dL    BUN 24 (H) 6 - 20 mg/dL    Creatinine 1.00 0.70 - 1.30 mg/dL    BUN/Creatinine ratio 24 (H) 12 - 20      GFR est AA >60 >60 ml/min/1.73m2    GFR est non-AA >60 >60 ml/min/1.73m2    Calcium 9.5 8.5 - 10.1 mg/dL    Bilirubin, total 1.1 (H) 0.2 - 1.0 mg/dL    AST (SGOT) 18 15 - 37 U/L    ALT (SGPT) 21 12 - 78 U/L    Alk. phosphatase 101 45 - 117 U/L    Protein, total 7.7 6.4 - 8.2 g/dL    Albumin 4.7 3.5 - 5.0 g/dL    Globulin 3.0 2.0 - 4.0 g/dL    A-G Ratio 1.6 1.1 - 2.2     PROTHROMBIN TIME + INR    Collection Time: 08/30/21  8:44 PM   Result Value Ref Range    Prothrombin time 16.6 (H) 11.9 - 14.7 sec    INR 1.4 (H) 0.9 - 1.1     PTT    Collection Time: 08/30/21  8:44 PM   Result Value Ref Range    aPTT 29.0 21.2 - 34.1 sec    aPTT, therapeutic range   82 - 109 sec   TYPE & SCREEN    Collection Time: 08/30/21  8:44 PM   Result Value Ref Range    Crossmatch Expiration 09/02/2021,2359     ABO/Rh(D) O Positive     Antibody screen Negative      Xrays:    X-rays of the left hand were available for review. Note was made of a nondisplaced, slightly comminuted fracture of the midshaft of the 1st metacarpal.  No other fractures were noted. Physical Exam  Constitutional:       General: He is not in acute distress. Appearance: Normal appearance. He is not ill-appearing, toxic-appearing or diaphoretic.    HENT: Head: Normocephalic and atraumatic. Mouth/Throat:      Mouth: Mucous membranes are moist.      Pharynx: Oropharynx is clear. Eyes:      Extraocular Movements: Extraocular movements intact. Conjunctiva/sclera: Conjunctivae normal.   Cardiovascular:      Rate and Rhythm: Normal rate and regular rhythm. Pulmonary:      Effort: Pulmonary effort is normal.      Breath sounds: Normal breath sounds. Abdominal:      General: Abdomen is flat. Tenderness: There is no abdominal tenderness. Musculoskeletal:      Right upper arm: Normal.      Left upper arm: Normal.      Right elbow: No swelling, deformity, effusion or lacerations. Normal range of motion. No tenderness. Left elbow: No swelling, deformity, effusion or lacerations. Normal range of motion. No tenderness. Right forearm: Laceration (Abrasion) and tenderness present. No swelling, edema, deformity or bony tenderness. Left forearm: No swelling, edema, deformity, lacerations, tenderness or bony tenderness. Right wrist: Normal.      Left wrist: Normal.      Right hand: unremarkable     Left hand: Laceration 3.5 cm long, 2 cm deep, tenderness and bony tenderness present. Decreased range of motion. Normal strength. Normal sensation. Normal capillary refill. Normal pulse. Cervical back: Normal range of motion and neck supple. Neurological:      Mental Status: He is alert and oriented to person, place, and time. Sensory: No sensory deficit.       Motor: No weakness.

## 2021-08-31 NOTE — ANESTHESIA PREPROCEDURE EVALUATION
Relevant Problems   No relevant active problems       Anesthetic History   No history of anesthetic complications            Review of Systems / Medical History  Patient summary reviewed, nursing notes reviewed and pertinent labs reviewed    Pulmonary          Smoker         Neuro/Psych              Cardiovascular  Within defined limits                     GI/Hepatic/Renal  Within defined limits              Endo/Other             Other Findings   Comments: DOG BITE   PT/INR 16.6/1.4    X-RAY HAND: Narrative & Impression  Three-view left hand     Comminuted fracture of the first metacarpal, fracture line partially obscured by  overlying subcutaneous emphysema.  No foreign body           Physical Exam    Airway  Mallampati: I  TM Distance: > 6 cm  Neck ROM: normal range of motion   Mouth opening: Normal     Cardiovascular    Rhythm: regular  Rate: normal      Pertinent negatives: No murmur   Dental    Dentition: Upper dentition intact and Lower dentition intact     Pulmonary      Decreased breath sounds           Abdominal         Other Findings            Anesthetic Plan    ASA: 2, emergent  Anesthesia type: general          Induction: Intravenous  Anesthetic plan and risks discussed with: Patient

## 2021-08-31 NOTE — ANESTHESIA POSTPROCEDURE EVALUATION
Procedure(s):  INCISION AND DRAINAGE LEFT HAND DOG BITE, I&d OF RIGHT FOREARM DOG BITE  POSSIBLE HAND METACARPAL OPEN REDUCTION INTERNAL FIXATION. general    Anesthesia Post Evaluation        Comments: PT HAS ALREADY BEEN DISCHARGED. INITIAL Post-op Vital signs: No vitals data found for the desired time range.

## 2021-08-31 NOTE — BRIEF OP NOTE
Brief Postoperative Note    Patient: Lonny Capellan  YOB: 2000  MRN: [id number]    Date of Procedure: 8/31/2021     Pre-Op Diagnosis: Dog bite left hand and right forearm    Post-Op Diagnosis: Same as preoperative diagnosis.       Procedure(s):  INCISION AND DRAINAGE LEFT HAND DOG BITE, I&d OF RIGHT FOREARM DOG BITE    Surgeon(s):  Kylie Castano MD    Surgical Assistant: Surg Asst-1: Kevin Moreno    Anesthesia: General     Estimated Blood Loss (mL): Minimal    Complications: None    Specimens: * No specimens in log *     Implants: * No implants in log *    Drains: * No LDAs found *    Findings: See full operative report for details    Electronically Signed by Yessenia Chino MD on 8/31/2021 at 5:19 PM

## 2021-08-31 NOTE — ROUTINE PROCESS
TRANSFER - OUT REPORT:    Verbal report given kirsten on Elmore Lady  being transferred to same day for routine progression of care       Report consisted of patients Situation, Background, Assessment and   Recommendations(SBAR). Information from the following report(s) SBAR was reviewed with the receiving nurse. Lines:   Peripheral IV 08/30/21 Anterior; Left Forearm (Active)        Opportunity for questions and clarification was provided.       Patient transported with:   tech

## 2021-09-01 PROCEDURE — 74011250636 HC RX REV CODE- 250/636: Performed by: STUDENT IN AN ORGANIZED HEALTH CARE EDUCATION/TRAINING PROGRAM

## 2021-09-01 PROCEDURE — 74011000258 HC RX REV CODE- 258: Performed by: STUDENT IN AN ORGANIZED HEALTH CARE EDUCATION/TRAINING PROGRAM

## 2021-09-01 PROCEDURE — 65270000029 HC RM PRIVATE

## 2021-09-01 PROCEDURE — 74011250636 HC RX REV CODE- 250/636: Performed by: ORTHOPAEDIC SURGERY

## 2021-09-01 PROCEDURE — 97530 THERAPEUTIC ACTIVITIES: CPT

## 2021-09-01 PROCEDURE — 97165 OT EVAL LOW COMPLEX 30 MIN: CPT

## 2021-09-01 PROCEDURE — 74011250637 HC RX REV CODE- 250/637: Performed by: ORTHOPAEDIC SURGERY

## 2021-09-01 RX ORDER — OXYCODONE HYDROCHLORIDE 5 MG/1
5 TABLET ORAL
Status: DISCONTINUED | OUTPATIENT
Start: 2021-09-01 | End: 2021-09-02 | Stop reason: HOSPADM

## 2021-09-01 RX ADMIN — AMPICILLIN SODIUM AND SULBACTAM SODIUM 3 G: 2; 1 INJECTION, POWDER, FOR SOLUTION INTRAMUSCULAR; INTRAVENOUS at 18:04

## 2021-09-01 RX ADMIN — OXYCODONE 5 MG: 5 TABLET ORAL at 17:27

## 2021-09-01 RX ADMIN — Medication 10 ML: at 18:04

## 2021-09-01 RX ADMIN — OXYCODONE 5 MG: 5 TABLET ORAL at 08:07

## 2021-09-01 RX ADMIN — OXYCODONE 5 MG: 5 TABLET ORAL at 23:01

## 2021-09-01 RX ADMIN — MORPHINE SULFATE 4 MG: 4 INJECTION, SOLUTION INTRAMUSCULAR; INTRAVENOUS at 15:24

## 2021-09-01 RX ADMIN — OXYCODONE 5 MG: 5 TABLET ORAL at 12:38

## 2021-09-01 RX ADMIN — Medication 10 ML: at 06:33

## 2021-09-01 RX ADMIN — AMPICILLIN SODIUM AND SULBACTAM SODIUM 3 G: 2; 1 INJECTION, POWDER, FOR SOLUTION INTRAMUSCULAR; INTRAVENOUS at 04:06

## 2021-09-01 RX ADMIN — OXYCODONE 5 MG: 5 TABLET ORAL at 03:16

## 2021-09-01 RX ADMIN — AMPICILLIN SODIUM AND SULBACTAM SODIUM 3 G: 2; 1 INJECTION, POWDER, FOR SOLUTION INTRAMUSCULAR; INTRAVENOUS at 10:08

## 2021-09-01 NOTE — PROGRESS NOTES
OCCUPATIONAL THERAPY EVALUATION/DISCHARGE  Patient: Campbell Patterson (37 y.o. male)  Date: 9/1/2021  Primary Diagnosis: Dog bite [F32. 0XXA]  Comminuted fracture [T14. 8XXA]  Procedure(s) (LRB):  INCISION AND DRAINAGE LEFT HAND DOG BITE, I&d OF RIGHT FOREARM DOG BITE (Bilateral) 1 Day Post-Op   Precautions: standard       ASSESSMENT  Pt is a 23 y/o M with no significant PMH presented to [facility] ED after being bit at home by his dog. Pt found with dog bite of left hand with extensive laceration, punctate dog bite or right forearm of indeterminate depth, and non-displaced open fracture of left 1st metacarpal. Pt seen by orthopedics and is s/p I&D left hand dog bite, I&D right forearm dog bite with Dr. Mellissa Zapata on 8/31/21. OT orders received 8/31/21 for \"in house and post discharge occupational therapy, mobilization and functional rehab program, as well as, if possible, whirlpool treatment and wound care daily. \"     Pt received semi-supine in bed upon arrival, AXO x4, and agreeable to OT evaluation at this time. Per pt report, recently moved from [place] a few weeks ago, currently lives with a roommate in a two-story motel with 10 JASMYN and B HR, was IND with ADLs/IADLs, working at a [employer] company P[employer]. No DME owned. Based on current observations, pt presents with deficits in generalized strength/AROM (L UE>R UE) and pain, otherwise at functional baseline, Mod I-IND for ADLs and functional mobility at this time. Pt demos IND bed mobility and STS transfers, Mod I donning socks, simulated Mod I grooming and feeding, utilizing compensatory techniques to open crackers on bedside. Pt provided with soft foam and gentle UE HEP for L digits 2-5 good understanding verbalized/demonstrated during session today. Pt otherwise tolerates session fair today with no further skilled acute OT services indicated as pt is Mod I-IND with self cares.  Spoke to RN who reported RNs are currently doing dressing changes/wound care and to re-consult OT/PT if whirlpool treatment is deemed necessary. OT recommending d/c home and to follow up in [address] at a later date if/when advised by MD.     Other factors to consider for discharge: IND at baseline     PLAN :  Recommendation for discharge: (in order for the patient to meet his/her long term goals)  Home self care, no acute OT needs at this time. Follow up with OPOT if/when advised by MD.     This discharge recommendation:  Has been made in collaboration with the attending provider and/or case management    IF patient discharges home will need the following DME: none       SUBJECTIVE:   Patient stated I'm ready to go home.     OBJECTIVE DATA SUMMARY:   HISTORY:   No past medical history on file. Past Surgical History:   Procedure Laterality Date    HX APPENDECTOMY  2002    HX HEENT  2011    mole removed from eye       Expanded or extensive additional review of patient history:   Home Situation  Home Environment: Private residence [facility])  # Steps to Enter: 10  One/Two Story Residence: Two story  Living Alone: No  Support Systems: Friends \ neighbors (Roommate)  Patient Expects to be Discharged toF Cor[de-identified]ration  Current DME Used/Available at Home: None    Hand dominance: Right    EXAMINATION OF PERFORMANCE DEFICITS:  Cognitive/Behavioral Status:  Neurologic State: Alert  Orientation Level: Oriented X4  Cognition: Follows commands    Skin: L hand and R forearm wrapped in bandaging     Hearing: Auditory  Auditory Impairment: None    Vision/Perceptual:     WFL      Range of Motion:  AROM: Generally decreased, functional (L hand/wrist limited )    Strength:  Strength: Generally decreased, functional    Coordination:  Coordination: Generally decreased, functional  Fine Motor Skills-Upper: Comment (L hand grossly decreased, functional)       Tone & Sensation:  Sensation: Intact    Balance:  Sitting: Intact; Without support  Standing: Intact; Without support    Functional Mobility and Transfers for ADLs:  Bed Mobility:  Rolling: Independent  Supine to Sit: Independent  Sit to Supine: Independent  Scooting: Independent    Transfers:  Sit to Stand: Independent  Stand to Sit: Independent  Bathroom Mobility: Independent (Simulated bed to sink)    ADL Intervention and task modifications:  Grooming  Grooming Assistance: Modified independent (Simulated)  Position Performed: Standing    Lower Body Dressing Assistance  Socks: Modified independent  Leg Crossed Method Used: Yes  Position Performed: Seated edge of bed    Therapeutic Exercise:  Pt educated on UE HEP to complete throughout the day to improve ROM and strength with good understanding verbalized and demonstrated. Functional Measure:    [facility] AM-PACTM \"6 Clicks\"                                                       Daily Activity Inpatient Short Form  How much help from another person does the patient currently need. .. Total; A Lot A Little None   1. Putting on and taking off regular lower body clothing? []  1 []  2 []  3 [x]  4   2. Bathing (including washing, rinsing, drying)? []  1 []  2 []  3 [x]  4   3. Toileting, which includes using toilet, bedpan or urinal? [] 1 []  2 []  3 [x]  4   4. Putting on and taking off regular upper body clothing? []  1 []  2 []  3 [x]  4   5. Taking care of personal grooming such as brushing teeth? []  1 []  2 []  3 [x]  4   6. Eating meals? []  1 []  2 []  3 [x]  4   © 2007, Trustees of [facility], under license to [employer]. All rights reserved     Score: 24/24     Interpretation of Tool:  Represents clinically-significant functional categories (i.e. Activities of daily living).   Percentage of Impairment CH    0%   CI    1-19% CJ    20-39% CK    40-59% CL    60-79% CM    80-99% CN     100%   [facility]  Score 6-24 24 23 20-22 15-19 10-14 7-9 6       Occupational Therapy Evaluation Charge Determination   History Examination Decision-Making   LOW Complexity : Brief history review  LOW Complexity : 1-3 performance deficits relating to physical, cognitive , or psychosocial skils that result in activity limitations and / or participation restrictions  LOW Complexity : No comorbidities that affect functional and no verbal or physical assistance needed to complete eval tasks       Based on the above components, the patient evaluation is determined to be of the following complexity level: LOW   Pain Rating:  Pt c/o pain to R forearm, L hand, no formal rating provided at this time     Activity Tolerance: WNL  Please refer to the flowsheet for vital signs taken during this treatment. After treatment patient left in no apparent distress:    Supine in bed and Call bell within reach    COMMUNICATION/EDUCATION:   The patients plan of care was discussed with: Registered nurse.      Thank you for this referral.  Hermila Strickland  Time Calculation: 23 mins

## 2021-09-02 VITALS
HEIGHT: 67 IN | OXYGEN SATURATION: 100 % | RESPIRATION RATE: 18 BRPM | BODY MASS INDEX: 24.54 KG/M2 | WEIGHT: 156.38 LBS | SYSTOLIC BLOOD PRESSURE: 115 MMHG | TEMPERATURE: 98.1 F | DIASTOLIC BLOOD PRESSURE: 74 MMHG | HEART RATE: 88 BPM

## 2021-09-02 PROCEDURE — 99222 1ST HOSP IP/OBS MODERATE 55: CPT | Performed by: INTERNAL MEDICINE

## 2021-09-02 PROCEDURE — 74011250637 HC RX REV CODE- 250/637: Performed by: ORTHOPAEDIC SURGERY

## 2021-09-02 PROCEDURE — 74011250636 HC RX REV CODE- 250/636: Performed by: ORTHOPAEDIC SURGERY

## 2021-09-02 PROCEDURE — 74011000258 HC RX REV CODE- 258: Performed by: ORTHOPAEDIC SURGERY

## 2021-09-02 RX ORDER — DICLOFENAC SODIUM 75 MG/1
75 TABLET, DELAYED RELEASE ORAL 2 TIMES DAILY
Qty: 10 TABLET | Refills: 0 | Status: SHIPPED | OUTPATIENT
Start: 2021-09-02 | End: 2021-09-07

## 2021-09-02 RX ORDER — ACETAMINOPHEN 500 MG
1000 TABLET ORAL
Qty: 30 TABLET | Refills: 0 | Status: SHIPPED
Start: 2021-09-02

## 2021-09-02 RX ORDER — AMOXICILLIN AND CLAVULANATE POTASSIUM 875; 125 MG/1; MG/1
1 TABLET, FILM COATED ORAL 2 TIMES DAILY
Qty: 42 TABLET | Refills: 0 | Status: SHIPPED | OUTPATIENT
Start: 2021-09-02 | End: 2021-09-23

## 2021-09-02 RX ORDER — OXYCODONE HYDROCHLORIDE 5 MG/1
5 TABLET ORAL
Qty: 10 TABLET | Refills: 0 | Status: SHIPPED | OUTPATIENT
Start: 2021-09-02 | End: 2021-09-05

## 2021-09-02 RX ADMIN — Medication 10 ML: at 06:00

## 2021-09-02 RX ADMIN — OXYCODONE 5 MG: 5 TABLET ORAL at 09:57

## 2021-09-02 RX ADMIN — SODIUM CHLORIDE 3 G: 900 INJECTION INTRAVENOUS at 08:53

## 2021-09-02 NOTE — PROGRESS NOTES
Patient seen and examined with nurse. Patient is postop day 1 after I&D of left hand and right forearm dog bite. Patient is stable, normal and stable vital signs, alert and oriented. Examination showed intact and dry surgical dressings. PLAN:    1)   Continue with IV antibiotics during hospital stay, followed by oral Augmentin post discharge  2)  Case management to arrange for daily wound packing and dressing changes on an outpatient basis after discharge, either with occupational therapy  With whirlpool treatment, or at the [facility]. 3)  Case management to arrange for occupational therapy post discharge. Patient is cleared for discharge from orthopedic viewpoint. He will follow up in 1 week at [facility] for wound inspection and a clinical check. Please call [phone] for an appointment.

## 2021-09-02 NOTE — PROGRESS NOTES
Orthopedic progress note    Date:2021       Room:[facility]  Patient Name:Addison Lino     YOB: 2000     Age:21 y.o. Subjective    69-year-old male status post I&D left hand and I&D right forearm secondary to dog bite. Postop day #3. Patient doing well. He did complain of moderate to severe pain with dressing change yesterday. But he feels that pain is better today. No other complaints at this time. Objective           Vitals Last 24 Hours:  TEMPERATURE:  Temp  Av.2 °F (36.8 °C)  Min: 97.9 °F (36.6 °C)  Max: 99 °F (37.2 °C)  RESPIRATIONS RANGE: Resp  Av  Min: 16  Max: 20  PULSE OXIMETRY RANGE: SpO2  Av %  Min: 96 %  Max: 100 %  PULSE RANGE: Pulse  Av.8  Min: 51  Max: 88  BLOOD PRESSURE RANGE: Systolic (42GUP), IMQ:005 , Min:112 , HDK:792   ; Diastolic (83GDL), ANNE:67, Min:55, Max:96    Current Facility-Administered Medications   Medication Dose Route Frequency    ampicillin-sulbactam (UNASYN) 3 g in 0.9% sodium chloride (MBP/ADV) 100 mL MBP  3 g IntraVENous Q6H    oxyCODONE IR (ROXICODONE) tablet 5 mg  5 mg Oral Q4H PRN    sodium chloride (NS) flush 5-40 mL  5-40 mL IntraVENous Q8H    sodium chloride (NS) flush 5-40 mL  5-40 mL IntraVENous PRN      Review of Systems   Constitutional: Negative for malaise/fatigue. Respiratory: Negative for cough, shortness of breath and wheezing. Cardiovascular: Negative for chest pain and palpitations. Gastrointestinal: Negative for abdominal pain, heartburn, nausea and vomiting. Neurological: Negative for headaches. Musculoskeletal: Denies any numbness/tingling of operative extremity    I/O (24Hr):   No intake or output data in the 24 hours ending 21 1406  Objective  Labs/Imaging/Diagnostics    Labs:  CBC:  Recent Labs     21  2044   WBC 11.9*   RBC 5.10   HGB 16.2   HCT 45.7   MCV 89.6   RDW 12.0        CHEMISTRIES:  Recent Labs     214      K 3.4*      CO2 22   BUN 24*   CREA 1.00   CA 9.5   PT/INR:  Recent Labs     08/30/21 2044   INR 1.4*     APTT:  Recent Labs     08/30/21 2044   APTT 29.0     LIVER PROFILE:  Recent Labs     08/30/21 2044   AST 18   ALT 21     Lab Results   Component Value Date/Time    ALT (SGPT) 21 08/30/2021 08:44 PM    AST (SGOT) 18 08/30/2021 08:44 PM    Alk. phosphatase 101 08/30/2021 08:44 PM    Bilirubin, total 1.1 (H) 08/30/2021 08:44 PM       Physical Exam:  Left hand: Dressing was removed by me. Wound site healing well. No necrotic tissue seen. Mild serosanguineous drainage seen on dressing. Mild swelling seen of the palm of his left hand. No streaking or erythema seen. Radial pulses palpable. Good range of motion of the fingers of his left hand. Mild discomfort to flexion extension of the thumb of his left hand. Cap refill is 2 seconds. Radial pulse palpable. Full range of motion left elbow shoulder without tenderness. Left upper extremity neurovascular intact. Right upper extremity: Dressing was removed by me. Puncture wound seen of the volar aspect of his right forearm was healing well. No necrotic tissue seen. Mild serosanguineous drainage seen. Mild swelling seen around puncture site. Full range of motion right hand fingers elbow and shoulder without tenderness. Cap refill is 2 seconds. Radial pulse palpable.  strength is 4 out of 5. EPL intact. Right upper semineurovascularly intact. Assessment//Plan           Patient Active Problem List    Diagnosis Date Noted    Dog bite 08/30/2021    Comminuted fracture 08/30/2021     Status post I&D left hand and I&D right forearm secondary to dog bite. Postop day #3. New dressing was applied today consisting of Aquacel Ag rope, 4 x 4's, ABD pad, Kerlix, Coban wrap to left hand and right forearm. Continue with daily dressing changes until next office visit.   The patient states he has a family friend who is a home health care nurse and is able to perform the daily dressing changes. Dressings and supplies will be given to the patient for home use. Plan for patient to follow-up with Dr. Romel Izaguirre as an outpatient within 1 week. Discussed antibiotic therapy with infectious disease, appreciate recommendations. Will discharge patient home on Augmentin as recommended.     Electronically signed by Anum Blackwell PA-C on 9/2/2021 at 2:06 PM

## 2021-09-02 NOTE — PROGRESS NOTES
Discharge plan of care/case management plan validated with provider discharge order. Discharged home on self care,Discharge instructions given and patient verbalized understanding. IV Had earlier been removed with no complications. Left with the girlfriend and no complains raised.

## 2021-09-02 NOTE — CONSULTS
Consult Date: 9/2/2021    Consults  Recommendation for antibiotics at discharge    Subjective  This is a 24year old male who was apparently bitten by his own dog with injuries to his right forearm and left hand. He was seen by Orthopedics and underwent I&D of both sites. No cultures identified. WBC was elevated on admission. He has been afebrile. Patient currently on IV Unasyn. ID has been consulted for this reason. Patient resting comfortably and states that he feels better since the surgery. Dog was Rottweiller and he affirmed that the dog is home under quarantine. No past medical history on file. Past Surgical History:   Procedure Laterality Date    HX APPENDECTOMY  2002    HX HEENT  2011    mole removed from eye     No family history on file. Social History     Tobacco Use    Smoking status: Never Smoker    Smokeless tobacco: Never Used   Substance Use Topics    Alcohol use: Never       Current Facility-Administered Medications   Medication Dose Route Frequency Provider Last Rate Last Admin    ampicillin-sulbactam (UNASYN) 3 g in 0.9% sodium chloride (MBP/ADV) 100 mL MBP  3 g IntraVENous Q6H Sharon Blunt  mL/hr at 09/02/21 0853 3 g at 09/02/21 0853    oxyCODONE IR (ROXICODONE) tablet 5 mg  5 mg Oral Q4H PRN Sharon Blunt MD   5 mg at 09/02/21 0957    morphine injection 4 mg  4 mg IntraVENous Q4H PRN Sharon Blunt MD   4 mg at 09/01/21 1524    sodium chloride (NS) flush 5-40 mL  5-40 mL IntraVENous Q8H Sharon Blunt MD   10 mL at 09/02/21 0600    sodium chloride (NS) flush 5-40 mL  5-40 mL IntraVENous PRN Sharon Blunt MD            Review of Systems   Constitutional: Negative for chills and fever. Musculoskeletal: Negative for arthralgias, joint swelling and myalgias. Skin: Positive for wound (right forearm, left hand). Allergic/Immunologic: Negative for immunocompromised state. Neurological: Negative. Hematological: Negative. Objective     Vital signs for last 24 hours:  Visit Vitals  BP (!) 112/59 (BP 1 Location: Left upper arm, BP Patient Position: At rest)   Pulse (!) 51   Temp 97.9 °F (36.6 °C)   Resp 18   Ht 5' 6.5\" (1.689 m)   Wt 156 lb 6 oz (70.9 kg)   SpO2 96%   BMI 24.86 kg/m²       Intake/Output this shift:  Current Shift: No intake/output data recorded. Last 3 Shifts: No intake/output data recorded. Data Review:   No results found for this or any previous visit (from the past 24 hour(s)). Physical Exam  Constitutional:       Appearance: Normal appearance. He is not ill-appearing. HENT:      Head: Normocephalic and atraumatic. Nose: Nose normal.      Mouth/Throat:      Pharynx: Oropharynx is clear. Eyes:      Pupils: Pupils are equal, round, and reactive to light. Cardiovascular:      Rate and Rhythm: Normal rate and regular rhythm. Heart sounds: No murmur heard. Pulmonary:      Effort: Pulmonary effort is normal.      Breath sounds: Normal breath sounds. Abdominal:      General: Bowel sounds are normal.      Palpations: Abdomen is soft. Tenderness: There is no abdominal tenderness. Genitourinary:     Comments: No Youssef  Musculoskeletal:         General: Signs of injury (right forearm and left hand) present. Cervical back: Neck supple. Right lower leg: No edema. Left lower leg: No edema. Comments: Both right forearm and left hand with bulky dressings, not removed at this time   Skin:     Findings: No erythema or rash. Neurological:      General: No focal deficit present. Mental Status: He is alert. Psychiatric:         Mood and Affect: Mood normal.         Behavior: Behavior normal.         Thought Content: Thought content normal.         Judgment: Judgment normal.       ASSESSMENT/PLAN    1. Animal bite to right forearm and left hand with infection, no cultures available, Day #4 IV Unasyn  2. Leukocytosis, secondary to #1    1.  Repeat CBC and check CRP/procal  2. At discharge, agree with transition to Augmentin 875 mg po BID for 14 days (Rx on chart)    Vijay Borja MD

## 2021-09-02 NOTE — PROGRESS NOTES
Problem: Pain  Goal: *Control of Pain  Outcome: Progressing Towards Goal     Problem: Patient Education: Go to Patient Education Activity  Goal: Patient/Family Education  Outcome: Progressing Towards Goal     Problem: Discharge Planning  Goal: *Discharge to safe environment  Outcome: Progressing Towards Goal  Goal: *Knowledge of medication management  Outcome: Progressing Towards Goal  Goal: *Knowledge of discharge instructions  Outcome: Progressing Towards Goal     Problem: Patient Education: Go to Patient Education Activity  Goal: Patient/Family Education  Outcome: Progressing Towards Goal     Problem: Patient Education: Go to Patient Education Activity  Goal: Patient/Family Education  Outcome: Progressing Towards Goal     Problem: Falls - Risk of  Goal: *Absence of Falls  Description: Document Florecita Moreau Fall Risk and appropriate interventions in the flowsheet.   Outcome: Progressing Towards Goal  Note: Fall Risk Interventions:            Medication Interventions: Patient to call before getting OOB, Teach patient to arise slowly

## 2021-09-02 NOTE — PROGRESS NOTES
14: 15PM Outbound call to [facility], @ [phone]. Spoke to admissions, and inquired if they accept uninsured patient's \"No. We can't do that-patient must have insurance. \"        DANIELE Garrison            13:42PM Outbound call to [facility] (outpatient), @ [phone]. Spoke to [employer], and inquired if they accept patient's w/o health insurance or Medicaid pending. \"No we don't accept patient's without insurance. Medicaid pending is still uninsured. \"      Appointment's available starting Sep 13th, 2021. No appointment made. DANIELE Garrison        Patient is currently uninsured and pending Medicaid. Medicaid application submitted on 1 Sep 2021.         DANIELE Garrison

## 2022-03-19 PROBLEM — T14.8XXA COMMINUTED FRACTURE: Status: ACTIVE | Noted: 2021-08-30
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
## 2023-05-15 RX ORDER — ACETAMINOPHEN 500 MG
1000 TABLET ORAL EVERY 8 HOURS PRN
COMMUNITY
Start: 2021-09-02

## 2023-05-15 RX ORDER — AMOXICILLIN AND CLAVULANATE POTASSIUM 875; 125 MG/1; MG/1
1 TABLET, FILM COATED ORAL 2 TIMES DAILY
COMMUNITY
Start: 2021-09-02

## (undated) DEVICE — SOUTHSIDE TURNOVER: Brand: MEDLINE INDUSTRIES, INC.

## (undated) DEVICE — SUPPORT ORTHOPEDIC ABD 28-50 IN UNIV 10 IN ELASTIC PROCARE

## (undated) DEVICE — GAUZE,SPONGE,4"X4",12PLY,STERILE,LF,2'S: Brand: MEDLINE

## (undated) DEVICE — PENCIL ELECTROCAUTERY 10 FT BLADE

## (undated) DEVICE — TAPE CST XF SET SPEC 4INX5YD --

## (undated) DEVICE — BANDAGE GAUZE 2NW X 4.1DL STRTCHD RAYON PLSTR CNFRMBLE STRTC

## (undated) DEVICE — SOLUTION IRRIG 500ML 0.9% SOD CHL USP POUR PLAS BTL

## (undated) DEVICE — BANDAGE,GAUZE,BULKEE II,4.5"X4.1YD,STRL: Brand: MEDLINE

## (undated) DEVICE — MINOR EXTREMITY PACK: Brand: MEDLINE INDUSTRIES, INC.

## (undated) DEVICE — DRAPE EQUIP C ARM 74X42 IN MOB XR W/ TIE RUBBER BND LF

## (undated) DEVICE — BASIC SINGLE BASIN-LF: Brand: MEDLINE INDUSTRIES, INC.

## (undated) DEVICE — REM POLYHESIVE ADULT PATIENT RETURN ELECTRODE: Brand: VALLEYLAB

## (undated) DEVICE — BANDAGE, ELASTIC, POLYESTER/SPANDEX, SELF CLOSURE, NON-STERILE, LATEX-FREE, WHITE, 4"X5YD: Brand: TRONEX INTERNATIONAL, INC.

## (undated) DEVICE — 3M™ STERI-DRAPE™ U-DRAPE 1015: Brand: STERI-DRAPE™

## (undated) DEVICE — PAD,ABDOMINAL,8"X7.5",STERILE,LF,1/PK: Brand: MEDLINE

## (undated) DEVICE — PADDING CAST W4INXL4YD SYN NAT PROTOUCH

## (undated) DEVICE — PREP SKN CHLRAPRP APL 26ML STR --

## (undated) DEVICE — GLOVE SURG SZ 85 L12IN FNGR THK79MIL GRN LTX FREE

## (undated) DEVICE — DRAPE,REIN 53X77,STERILE: Brand: MEDLINE

## (undated) DEVICE — DRAPE,HAND,STERILE: Brand: MEDLINE

## (undated) DEVICE — SUT ETHLN 3-0 18IN PS1 BLK --

## (undated) DEVICE — GLOVE SURG SZ 8 CRM LTX FREE POLYISOPRENE POLYMER BEAD ANTI

## (undated) DEVICE — SUTURE VCRL SZ 2-0 L27IN ABSRB UD L26MM CT-2 1/2 CIR J269H

## (undated) DEVICE — WET SKIN PREP TRAY: Brand: MEDLINE INDUSTRIES, INC.

## (undated) DEVICE — PADDING BNDG UNDERCAST 3 MRX10 CM N ABSORBENT N WOVEN ARTFLX

## (undated) DEVICE — SUTURE VCRL RAPIDE SZ 4-0 L27IN ABSRB UD PS-2 L19MM 3/8 CIR VR426

## (undated) DEVICE — 3M™ STERI-DRAPE™ INCISE DRAPE 1050 (60CM X 45CM): Brand: STERI-DRAPE™

## (undated) DEVICE — PREP PAD BNS: Brand: CONVERTORS

## (undated) DEVICE — COVER LT HNDL BLU PLAS

## (undated) DEVICE — SUT VCRL + 2-0 27IN CT2 UD -- 36/BX

## (undated) DEVICE — 3M™ BAIR HUGGER® CHEST ACCESS BLANKET, FULL BODY, 10 PER CASE 30000: Brand: BAIR HUGGER™

## (undated) DEVICE — GARMENT,MEDLINE,DVT,INT,CALF,MED, GEN2: Brand: MEDLINE

## (undated) DEVICE — STAPLER SKIN H3.9MM WIRE DIA0.58MM CRWN 6.9MM 35 STPL ROT